# Patient Record
Sex: FEMALE | Race: WHITE
[De-identification: names, ages, dates, MRNs, and addresses within clinical notes are randomized per-mention and may not be internally consistent; named-entity substitution may affect disease eponyms.]

---

## 2017-09-17 ENCOUNTER — HOSPITAL ENCOUNTER (EMERGENCY)
Dept: HOSPITAL 26 - MED | Age: 77
Discharge: HOME | End: 2017-09-17
Payer: MEDICAID

## 2017-09-17 VITALS — DIASTOLIC BLOOD PRESSURE: 75 MMHG | SYSTOLIC BLOOD PRESSURE: 146 MMHG

## 2017-09-17 VITALS — DIASTOLIC BLOOD PRESSURE: 69 MMHG | SYSTOLIC BLOOD PRESSURE: 147 MMHG

## 2017-09-17 VITALS — BODY MASS INDEX: 27.08 KG/M2 | HEIGHT: 55 IN | WEIGHT: 117 LBS

## 2017-09-17 DIAGNOSIS — J45.909: ICD-10-CM

## 2017-09-17 DIAGNOSIS — Z79.899: ICD-10-CM

## 2017-09-17 DIAGNOSIS — E11.9: ICD-10-CM

## 2017-09-17 DIAGNOSIS — Z88.0: ICD-10-CM

## 2017-09-17 DIAGNOSIS — M19.90: ICD-10-CM

## 2017-09-17 DIAGNOSIS — I10: Primary | ICD-10-CM

## 2018-03-16 ENCOUNTER — HOSPITAL ENCOUNTER (EMERGENCY)
Dept: HOSPITAL 26 - MED | Age: 78
Discharge: HOME | End: 2018-03-16
Payer: MEDICAID

## 2018-03-16 VITALS — DIASTOLIC BLOOD PRESSURE: 86 MMHG | SYSTOLIC BLOOD PRESSURE: 123 MMHG

## 2018-03-16 VITALS — HEIGHT: 55 IN | WEIGHT: 119 LBS | BODY MASS INDEX: 27.54 KG/M2

## 2018-03-16 VITALS — DIASTOLIC BLOOD PRESSURE: 78 MMHG | SYSTOLIC BLOOD PRESSURE: 128 MMHG

## 2018-03-16 DIAGNOSIS — J20.9: Primary | ICD-10-CM

## 2018-03-16 DIAGNOSIS — J32.9: ICD-10-CM

## 2018-03-16 PROCEDURE — 87804 INFLUENZA ASSAY W/OPTIC: CPT

## 2018-03-16 PROCEDURE — 71045 X-RAY EXAM CHEST 1 VIEW: CPT

## 2018-03-16 PROCEDURE — 36415 COLL VENOUS BLD VENIPUNCTURE: CPT

## 2018-03-16 PROCEDURE — 99285 EMERGENCY DEPT VISIT HI MDM: CPT

## 2018-03-16 NOTE — NUR
Patient discharged with v/s stable. Written and verbal after care instructions 
given and explained. 

Patient alert, oriented and verbalized understanding of instructions. 
Ambulatory with steady gait. All questions addressed prior to discharge. ID 
band removed. Patient advised to follow up with PMD. Rx of 
ROBITUSSIN/AUGMENTIN/SUDAFED given. Patient educated on indication of 
medication including possible reaction and side effects. Opportunity to ask 
questions provided and answered.

## 2018-03-16 NOTE — NUR
PATIENT PRESENTS TO ED WITH COUGH PRODUCTIVE BODYACHES . PT STATES  . DENIES 
N/V/D; SKIN IS PINK/WARM/DRY; AAOX4 WITH EVEN AND STEADY GAIT; LUNGS RHONCHI 
UPPER AIRWAY ; HR EVEN AND REGULAR;  PATIENT STATES PAIN OF 5/10 AT THIS TIME; 
VSS; PATIENT POSITIONED FOR COMFORT; HOB ELEVATED; BEDRAILS UP X2; BED DOWN. ER 
MD MADE AWARE OF PT STATUS.

## 2018-10-20 ENCOUNTER — HOSPITAL ENCOUNTER (EMERGENCY)
Dept: HOSPITAL 26 - MED | Age: 78
Discharge: HOME | End: 2018-10-20
Payer: MEDICAID

## 2018-10-20 VITALS — DIASTOLIC BLOOD PRESSURE: 74 MMHG | SYSTOLIC BLOOD PRESSURE: 146 MMHG

## 2018-10-20 VITALS — BODY MASS INDEX: 37.76 KG/M2 | HEIGHT: 57 IN | WEIGHT: 175 LBS

## 2018-10-20 VITALS — SYSTOLIC BLOOD PRESSURE: 155 MMHG | DIASTOLIC BLOOD PRESSURE: 71 MMHG

## 2018-10-20 DIAGNOSIS — I10: ICD-10-CM
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
## 2018-10-20 DIAGNOSIS — J45.909: ICD-10-CM

## 2018-10-20 DIAGNOSIS — S09.90XA: ICD-10-CM
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
## 2018-10-20 DIAGNOSIS — Z79.84: ICD-10-CM

## 2018-10-20 DIAGNOSIS — E11.9: ICD-10-CM

## 2018-10-20 DIAGNOSIS — S16.1XXA: Primary | ICD-10-CM
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
## 2018-10-20 DIAGNOSIS — Z79.899: ICD-10-CM

## 2018-10-20 DIAGNOSIS — Z88.0: ICD-10-CM
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
## 2018-10-20 DIAGNOSIS — Z79.1: ICD-10-CM

## 2018-10-20 PROCEDURE — 96372 THER/PROPH/DIAG INJ SC/IM: CPT

## 2018-10-20 PROCEDURE — 72125 CT NECK SPINE W/O DYE: CPT

## 2018-10-20 PROCEDURE — 99284 EMERGENCY DEPT VISIT MOD MDM: CPT

## 2018-10-20 PROCEDURE — 70450 CT HEAD/BRAIN W/O DYE: CPT

## 2018-10-20 NOTE — NUR
Patient discharged with v/s stable. Written and verbal after care instructions 
given and explained. 

Patient alert, oriented and verbalized understanding of instructions. 
Ambulatory with steady gait. All questions addressed prior to discharge. ID 
band removed. Patient advised to follow up with PMD. Rx of MOTRIN TRAMADOL 
given. Patient educated on indication of medication including possible reaction 
and side effects. Opportunity to ask questions provided and answered.

## 2018-10-20 NOTE — NUR
78 yo f bib dtr w/ c/o head pain s/p hitting head on a door corner 2 days ago. 
pt denies n/v. no laceration. pain 9/10 burning pain radiates from forehead 
down the left side of body. pt reports dizziness, PERRLA, NO SLURRED SPEECH, 
AAOX4, ER MD MADE AWARE, WILL CONTINUE TO MONITOR. 



hx arthritis

## 2020-11-30 ENCOUNTER — HOSPITAL ENCOUNTER (EMERGENCY)
Dept: HOSPITAL 26 - MED | Age: 80
Discharge: HOME | End: 2020-11-30
Payer: MEDICAID

## 2020-11-30 VITALS — DIASTOLIC BLOOD PRESSURE: 71 MMHG | SYSTOLIC BLOOD PRESSURE: 166 MMHG

## 2020-11-30 VITALS — BODY MASS INDEX: 26.61 KG/M2 | WEIGHT: 115 LBS | HEIGHT: 55 IN

## 2020-11-30 DIAGNOSIS — Z79.82: ICD-10-CM
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
## 2020-11-30 DIAGNOSIS — S09.90XA: ICD-10-CM

## 2020-11-30 DIAGNOSIS — Z88.0: ICD-10-CM

## 2020-11-30 DIAGNOSIS — I10: ICD-10-CM

## 2020-11-30 DIAGNOSIS — S16.1XXA: Primary | ICD-10-CM
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
## 2020-11-30 DIAGNOSIS — Z79.899: ICD-10-CM

## 2020-11-30 DIAGNOSIS — J45.909: ICD-10-CM
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
## 2020-11-30 DIAGNOSIS — E11.9: ICD-10-CM

## 2020-11-30 RX ADMIN — ACETAMINOPHEN ONE MG: 325 TABLET ORAL at 19:58

## 2020-11-30 RX ADMIN — ACETAMINOPHEN ONE MG: 325 TABLET ORAL at 19:05

## 2020-11-30 NOTE — NUR
Patient discharged with v/s stable. Written and verbal after care instructions 
given and explained. 

Patient alert, oriented and verbalized understanding of instructions. 
Ambulatory with steady gait. All questions addressed prior to discharge. ID 
band removed. Patient advised to follow up with PMD. Rx of IBUPROFEN

 given. Patient educated on indication of medication including possible 
reaction and side effects. Opportunity to ask questions provided and answered.

## 2020-11-30 NOTE — NUR
79 YEAR OLD FEMALE COMPLAINS OF FALL X 3DAYS AGO. PT STATES SHE WAS DIZZY PRIOR 
TO FALLING, WITH LOC. PT DENIES N/V/D, STATES VISION IS FINE WITHOUT CHANGE. PT 
COMPLAINS OF HEADACHE THAT FEELS LIKE A "RAY" OR LINE THAT GOES FROM FRONT OF 
HEAD TO BACK. PT AOX4, BREATHING EVEN AND UNLABORED, SKIN WARM AND DRY. BED IN 
LOWEST POSITION, LOCKED, BED RAIL UPX1.



PMH - DM2, HTN, OA

ALLERGIES - PCN

## 2021-07-02 ENCOUNTER — HOSPITAL ENCOUNTER (EMERGENCY)
Dept: HOSPITAL 26 - MED | Age: 81
Discharge: HOME | End: 2021-07-02
Payer: MEDICAID

## 2021-07-02 VITALS — BODY MASS INDEX: 29.39 KG/M2 | WEIGHT: 140 LBS | HEIGHT: 58 IN

## 2021-07-02 VITALS — SYSTOLIC BLOOD PRESSURE: 152 MMHG | DIASTOLIC BLOOD PRESSURE: 77 MMHG

## 2021-07-02 VITALS — SYSTOLIC BLOOD PRESSURE: 110 MMHG | DIASTOLIC BLOOD PRESSURE: 53 MMHG
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
## 2021-07-02 DIAGNOSIS — I10: ICD-10-CM
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
## 2021-07-02 DIAGNOSIS — E78.5: ICD-10-CM
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
## 2021-07-02 DIAGNOSIS — Z88.6: ICD-10-CM

## 2021-07-02 DIAGNOSIS — R51.9: Primary | ICD-10-CM

## 2021-07-02 DIAGNOSIS — Z88.0: ICD-10-CM

## 2021-07-02 DIAGNOSIS — Z79.899: ICD-10-CM

## 2021-07-02 DIAGNOSIS — Z79.82: ICD-10-CM

## 2021-07-02 DIAGNOSIS — E11.9: ICD-10-CM

## 2021-07-02 LAB
ANION GAP SERPL CALCULATED.3IONS-SCNC: 13.4 MMOL/L (ref 8–16)
APPEARANCE UR: CLEAR
BASOPHILS # BLD AUTO: 0.1 K/UL (ref 0–0.22)
BASOPHILS NFR BLD AUTO: 1.1 % (ref 0–2)
BILIRUB UR QL STRIP: NEGATIVE
BUN SERPL-MCNC: 20 MG/DL (ref 7–18)
CHLORIDE SERPL-SCNC: 99 MMOL/L (ref 98–107)
CO2 SERPL-SCNC: 25.6 MMOL/L (ref 21–32)
COLOR UR: YELLOW
CREAT SERPL-MCNC: 0.6 MG/DL (ref 0.6–1.3)
EOSINOPHIL # BLD AUTO: 0.2 K/UL (ref 0–0.4)
EOSINOPHIL NFR BLD AUTO: 2.7 % (ref 0–4)
ERYTHROCYTE [DISTWIDTH] IN BLOOD BY AUTOMATED COUNT: 12.5 % (ref 11.6–13.7)
GFR SERPL CREATININE-BSD FRML MDRD: (no result) ML/MIN (ref 90–?)
GLUCOSE SERPL-MCNC: 90 MG/DL (ref 74–106)
GLUCOSE UR STRIP-MCNC: NEGATIVE MG/DL
HCT VFR BLD AUTO: 35.3 % (ref 36–48)
HGB BLD-MCNC: 11.7 G/DL (ref 12–16)
HGB UR QL STRIP: (no result)
LEUKOCYTE ESTERASE UR QL STRIP: NEGATIVE
LYMPHOCYTES # BLD AUTO: 1.7 K/UL (ref 2.5–16.5)
LYMPHOCYTES NFR BLD AUTO: 30.6 % (ref 20.5–51.1)
MCH RBC QN AUTO: 28 PG (ref 27–31)
MCHC RBC AUTO-ENTMCNC: 33 G/DL (ref 33–37)
MCV RBC AUTO: 83.4 FL (ref 80–94)
MONOCYTES # BLD AUTO: 0.6 K/UL (ref 0.8–1)
MONOCYTES NFR BLD AUTO: 10.9 % (ref 1.7–9.3)
NEUTROPHILS # BLD AUTO: 3.1 K/UL (ref 1.8–7.7)
NEUTROPHILS NFR BLD AUTO: 54.7 % (ref 42.2–75.2)
NITRITE UR QL STRIP: NEGATIVE
PH UR STRIP: 6 [PH] (ref 5–9)
PLATELET # BLD AUTO: 242 K/UL (ref 140–450)
POTASSIUM SERPL-SCNC: 4 MMOL/L (ref 3.5–5.1)
RBC # BLD AUTO: 4.23 MIL/UL (ref 4.2–5.4)
RBC #/AREA URNS HPF: (no result) /HPF (ref 0–5)
SODIUM SERPL-SCNC: 134 MMOL/L (ref 136–145)
WBC # BLD AUTO: 5.7 K/UL (ref 4.8–10.8)
WBC,URINE: (no result) /HPF (ref 0–5)

## 2021-10-27 ENCOUNTER — HOSPITAL ENCOUNTER (EMERGENCY)
Dept: HOSPITAL 26 - MED | Age: 81
Discharge: HOME | End: 2021-10-27
Payer: MEDICAID

## 2021-10-27 VITALS — DIASTOLIC BLOOD PRESSURE: 54 MMHG | SYSTOLIC BLOOD PRESSURE: 127 MMHG

## 2021-10-27 VITALS — BODY MASS INDEX: 26.15 KG/M2 | HEIGHT: 55 IN | WEIGHT: 113 LBS

## 2021-10-27 VITALS — DIASTOLIC BLOOD PRESSURE: 81 MMHG | SYSTOLIC BLOOD PRESSURE: 155 MMHG

## 2021-10-27 DIAGNOSIS — Z79.899: ICD-10-CM

## 2021-10-27 DIAGNOSIS — R20.0: ICD-10-CM

## 2021-10-27 DIAGNOSIS — K21.9: ICD-10-CM

## 2021-10-27 DIAGNOSIS — Z88.6: ICD-10-CM

## 2021-10-27 DIAGNOSIS — J45.909: ICD-10-CM

## 2021-10-27 DIAGNOSIS — E78.5: ICD-10-CM

## 2021-10-27 DIAGNOSIS — M79.10: Primary | ICD-10-CM

## 2021-10-27 DIAGNOSIS — R42: ICD-10-CM

## 2021-10-27 DIAGNOSIS — Z79.82: ICD-10-CM

## 2021-10-27 DIAGNOSIS — I10: ICD-10-CM

## 2021-10-27 DIAGNOSIS — Z88.0: ICD-10-CM

## 2021-10-27 DIAGNOSIS — M54.2: ICD-10-CM

## 2021-10-27 DIAGNOSIS — R51.9: ICD-10-CM

## 2021-10-27 LAB
ALBUMIN FLD-MCNC: 3.4 G/DL (ref 3.4–5)
ANION GAP SERPL CALCULATED.3IONS-SCNC: 9.3 MMOL/L (ref 8–16)
APPEARANCE UR: CLEAR
AST SERPL-CCNC: 28 U/L (ref 15–37)
BASOPHILS # BLD AUTO: 0.1 K/UL (ref 0–0.22)
BASOPHILS NFR BLD AUTO: 1.4 % (ref 0–2)
BILIRUB SERPL-MCNC: 0.4 MG/DL (ref 0–1)
BILIRUB UR QL STRIP: NEGATIVE
BUN SERPL-MCNC: 14 MG/DL (ref 7–18)
CHLORIDE SERPL-SCNC: 103 MMOL/L (ref 98–107)
CO2 SERPL-SCNC: 28.4 MMOL/L (ref 21–32)
COLOR UR: YELLOW
CREAT SERPL-MCNC: 0.5 MG/DL (ref 0.6–1.3)
EOSINOPHIL # BLD AUTO: 0.1 K/UL (ref 0–0.4)
EOSINOPHIL NFR BLD AUTO: 2.8 % (ref 0–4)
ERYTHROCYTE [DISTWIDTH] IN BLOOD BY AUTOMATED COUNT: 12.3 % (ref 11.6–13.7)
GFR SERPL CREATININE-BSD FRML MDRD: (no result) ML/MIN (ref 90–?)
GLUCOSE SERPL-MCNC: 78 MG/DL (ref 74–106)
GLUCOSE UR STRIP-MCNC: NEGATIVE MG/DL
HCT VFR BLD AUTO: 32.6 % (ref 36–48)
HGB BLD-MCNC: 10.8 G/DL (ref 12–16)
HGB UR QL STRIP: NEGATIVE
LEUKOCYTE ESTERASE UR QL STRIP: NEGATIVE
LYMPHOCYTES # BLD AUTO: 1.6 K/UL (ref 2.5–16.5)
LYMPHOCYTES NFR BLD AUTO: 32.7 % (ref 20.5–51.1)
MCH RBC QN AUTO: 28 PG (ref 27–31)
MCHC RBC AUTO-ENTMCNC: 33 G/DL (ref 33–37)
MCV RBC AUTO: 83.7 FL (ref 80–94)
MONOCYTES # BLD AUTO: 0.5 K/UL (ref 0.8–1)
MONOCYTES NFR BLD AUTO: 10 % (ref 1.7–9.3)
NEUTROPHILS # BLD AUTO: 2.5 K/UL (ref 1.8–7.7)
NEUTROPHILS NFR BLD AUTO: 53.1 % (ref 42.2–75.2)
NITRITE UR QL STRIP: NEGATIVE
PH UR STRIP: 6.5 [PH] (ref 5–9)
PLATELET # BLD AUTO: 251 K/UL (ref 140–450)
POTASSIUM SERPL-SCNC: 3.7 MMOL/L (ref 3.5–5.1)
RBC # BLD AUTO: 3.9 MIL/UL (ref 4.2–5.4)
SODIUM SERPL-SCNC: 137 MMOL/L (ref 136–145)
WBC # BLD AUTO: 4.8 K/UL (ref 4.8–10.8)

## 2021-10-27 PROCEDURE — 85025 COMPLETE CBC W/AUTO DIFF WBC: CPT

## 2021-10-27 PROCEDURE — 84443 ASSAY THYROID STIM HORMONE: CPT

## 2021-10-27 PROCEDURE — 81003 URINALYSIS AUTO W/O SCOPE: CPT

## 2021-10-27 PROCEDURE — 93005 ELECTROCARDIOGRAM TRACING: CPT

## 2021-10-27 PROCEDURE — 84484 ASSAY OF TROPONIN QUANT: CPT

## 2021-10-27 PROCEDURE — 36415 COLL VENOUS BLD VENIPUNCTURE: CPT

## 2021-10-27 PROCEDURE — 96374 THER/PROPH/DIAG INJ IV PUSH: CPT

## 2021-10-27 PROCEDURE — 99285 EMERGENCY DEPT VISIT HI MDM: CPT

## 2021-10-27 PROCEDURE — 80053 COMPREHEN METABOLIC PANEL: CPT

## 2021-10-27 PROCEDURE — 83880 ASSAY OF NATRIURETIC PEPTIDE: CPT

## 2021-10-27 PROCEDURE — 71045 X-RAY EXAM CHEST 1 VIEW: CPT

## 2021-10-27 PROCEDURE — 96375 TX/PRO/DX INJ NEW DRUG ADDON: CPT

## 2021-10-27 RX ADMIN — SODIUM CHLORIDE ONE MG: 9 INJECTION, SOLUTION INTRAVENOUS at 12:06

## 2021-10-27 RX ADMIN — SODIUM CHLORIDE ONE MG: 9 INJECTION, SOLUTION INTRAVENOUS at 12:05

## 2021-10-27 NOTE — NUR
Patient discharged with v/s stable. Written and verbal after care instructions 
given and explained. 

Patient alert, oriented and verbalized understanding of instructions. Wheel 
Chair Assisted with by parent. All questions addressed prior to discharge. ID 
band removed. Patient advised to follow up with PMD. Rx of VALIUM given. 
Patient educated on indication of medication including possible reaction and 
side effects. Opportunity to ask questions provided and answered.

## 2021-10-27 NOTE — NUR
Patient appears to be resting comfortably in bed WITH EYES CLOSED. Vital Signs 
within normal limits AND CHARTED. Respirations even and unlabored. DAUGHTER 
BEDSIDE WITH PATIENT

## 2021-10-27 NOTE — NUR
80/F BIB DAUGHTER WITH C/O BILATERAL LEG SWELLING SINCE LAST NIGHT. STATES HER 
BLOOD PRESSURE HAS BEEN ELEVATED AND SHE HAS BEEN EXPERIENCING INTERMITTENT 
EPISODES OF DIZZINESS. STATES HER PCP REFERRED TO ED FOR AN EKG. PT ALSO STATED 
SHE HAS HAD SOME PAIN RADIATING FROM HER HEAD DOWN HER NECK AND L ARM. PAIN IS 
CURRENTLY 10/10 AND STABBING LIKE PAIN. BREATH SOUNDS CLEAR BILATERALLY. MINOR 
PITTING EDEMA NOTED ON HER L ANKLE. S1/S2 HEAD AND CAP REFILL <2 SECONDS. SKIN 
DRY AND INTACT. 



MEDHX: HTN, ARTHRITIS

ALLERGIES: PENICILLINS, ACETAMINOPHEN

## 2022-11-27 ENCOUNTER — HOSPITAL ENCOUNTER (EMERGENCY)
Dept: HOSPITAL 26 - MED | Age: 82
Discharge: HOME | End: 2022-11-27
Payer: COMMERCIAL

## 2022-11-27 VITALS — SYSTOLIC BLOOD PRESSURE: 185 MMHG | DIASTOLIC BLOOD PRESSURE: 81 MMHG

## 2022-11-27 VITALS — HEIGHT: 55 IN | WEIGHT: 115 LBS | BODY MASS INDEX: 26.61 KG/M2

## 2022-11-27 VITALS — DIASTOLIC BLOOD PRESSURE: 78 MMHG | SYSTOLIC BLOOD PRESSURE: 148 MMHG

## 2022-11-27 DIAGNOSIS — Z20.822: ICD-10-CM

## 2022-11-27 DIAGNOSIS — Z88.0: ICD-10-CM

## 2022-11-27 DIAGNOSIS — J45.909: ICD-10-CM

## 2022-11-27 DIAGNOSIS — K04.7: Primary | ICD-10-CM

## 2022-11-27 DIAGNOSIS — D64.9: ICD-10-CM

## 2022-11-27 DIAGNOSIS — K21.9: ICD-10-CM

## 2022-11-27 DIAGNOSIS — R11.10: ICD-10-CM

## 2022-11-27 DIAGNOSIS — Z79.82: ICD-10-CM

## 2022-11-27 DIAGNOSIS — R05.9: ICD-10-CM

## 2022-11-27 DIAGNOSIS — Z88.6: ICD-10-CM

## 2022-11-27 DIAGNOSIS — E87.1: ICD-10-CM

## 2022-11-27 DIAGNOSIS — I10: ICD-10-CM

## 2022-11-27 DIAGNOSIS — K08.89: ICD-10-CM

## 2022-11-27 DIAGNOSIS — Z79.899: ICD-10-CM

## 2022-11-27 LAB
ALBUMIN FLD-MCNC: 3.1 G/DL (ref 3.4–5)
ANION GAP SERPL CALCULATED.3IONS-SCNC: 11.8 MMOL/L (ref 8–16)
APAP SERPL-MCNC: < 0.5 UG/ML (ref 10–30)
AST SERPL-CCNC: 37 U/L (ref 15–37)
BASOPHILS # BLD AUTO: 0 K/UL (ref 0–0.22)
BASOPHILS NFR BLD AUTO: 0.5 % (ref 0–2)
BILIRUB SERPL-MCNC: 0.3 MG/DL (ref 0–1)
BUN SERPL-MCNC: 10 MG/DL (ref 7–18)
CHLORIDE SERPL-SCNC: 87 MMOL/L (ref 98–107)
CO2 SERPL-SCNC: 27.2 MMOL/L (ref 21–32)
CREAT SERPL-MCNC: 0.5 MG/DL (ref 0.6–1.3)
EOSINOPHIL # BLD AUTO: 0 K/UL (ref 0–0.4)
EOSINOPHIL NFR BLD AUTO: 0.1 % (ref 0–4)
ERYTHROCYTE [DISTWIDTH] IN BLOOD BY AUTOMATED COUNT: 11.9 % (ref 11.6–13.7)
GFR SERPL CREATININE-BSD FRML MDRD: (no result) ML/MIN (ref 90–?)
GLUCOSE SERPL-MCNC: 125 MG/DL (ref 74–106)
HCT VFR BLD AUTO: 29.7 % (ref 36–48)
HGB BLD-MCNC: 9.8 G/DL (ref 12–16)
LYMPHOCYTES # BLD AUTO: 0.7 K/UL (ref 2.5–16.5)
LYMPHOCYTES NFR BLD AUTO: 11.8 % (ref 20.5–51.1)
MCH RBC QN AUTO: 27 PG (ref 27–31)
MCHC RBC AUTO-ENTMCNC: 33 G/DL (ref 33–37)
MCV RBC AUTO: 82.3 FL (ref 80–94)
MONOCYTES # BLD AUTO: 0.4 K/UL (ref 0.8–1)
MONOCYTES NFR BLD AUTO: 6 % (ref 1.7–9.3)
NEUTROPHILS # BLD AUTO: 4.9 K/UL (ref 1.8–7.7)
NEUTROPHILS NFR BLD AUTO: 81.6 % (ref 42.2–75.2)
PLATELET # BLD AUTO: 215 K/UL (ref 140–450)
POTASSIUM SERPL-SCNC: 4 MMOL/L (ref 3.5–5.1)
RBC # BLD AUTO: 3.61 MIL/UL (ref 4.2–5.4)
SODIUM SERPL-SCNC: 122 MMOL/L (ref 136–145)
WBC # BLD AUTO: 6 K/UL (ref 4.8–10.8)

## 2022-11-27 PROCEDURE — 36415 COLL VENOUS BLD VENIPUNCTURE: CPT

## 2022-11-27 PROCEDURE — 84484 ASSAY OF TROPONIN QUANT: CPT

## 2022-11-27 PROCEDURE — 87804 INFLUENZA ASSAY W/OPTIC: CPT

## 2022-11-27 PROCEDURE — G0480 DRUG TEST DEF 1-7 CLASSES: HCPCS

## 2022-11-27 PROCEDURE — 99285 EMERGENCY DEPT VISIT HI MDM: CPT

## 2022-11-27 PROCEDURE — 83880 ASSAY OF NATRIURETIC PEPTIDE: CPT

## 2022-11-27 PROCEDURE — 85025 COMPLETE CBC W/AUTO DIFF WBC: CPT

## 2022-11-27 PROCEDURE — 87426 SARSCOV CORONAVIRUS AG IA: CPT

## 2022-11-27 PROCEDURE — 71045 X-RAY EXAM CHEST 1 VIEW: CPT

## 2022-11-27 PROCEDURE — 80053 COMPREHEN METABOLIC PANEL: CPT

## 2022-11-27 PROCEDURE — 93005 ELECTROCARDIOGRAM TRACING: CPT

## 2022-11-27 NOTE — NUR
Patient discharged with v/s stable. Written and verbal after care instructions 
given and explained. 

Patient alert, oriented and verbalized understanding of instructions. 
Ambulatory with steady gait. All questions addressed prior to discharge. ID 
band removed. Patient advised to follow up with PMD. Rx of CLINDAMYCIN AND 
ZOFRAN given. Patient educated on indication of medication including possible 
reaction and side effects. Opportunity to ask questions provided and answered.

## 2022-11-27 NOTE — NUR
81F presents to ED with c/o cough and congestion x4days, and dizziness, nausea 
and headache since this morning. Pt reports a worsening productive cough, 
coughing up phlegm. Pt reports intermittent aching like, 8/10 headache. Pt 
states she took DayQuil yesterday and NyQuil this morning with no relief. Pt 
reports feeling dizzy shortly after takin the Nyquil. Pt on bedside monitor.

## 2023-04-01 ENCOUNTER — HOSPITAL ENCOUNTER (INPATIENT)
Dept: HOSPITAL 26 - MED | Age: 83
LOS: 4 days | Discharge: HOME | DRG: 720 | End: 2023-04-05
Attending: INTERNAL MEDICINE | Admitting: INTERNAL MEDICINE
Payer: COMMERCIAL

## 2023-04-01 VITALS — WEIGHT: 107 LBS | HEIGHT: 56 IN | BODY MASS INDEX: 24.07 KG/M2

## 2023-04-01 VITALS — DIASTOLIC BLOOD PRESSURE: 64 MMHG | SYSTOLIC BLOOD PRESSURE: 146 MMHG

## 2023-04-01 DIAGNOSIS — J45.909: ICD-10-CM

## 2023-04-01 DIAGNOSIS — Z88.8: ICD-10-CM

## 2023-04-01 DIAGNOSIS — Z88.0: ICD-10-CM

## 2023-04-01 DIAGNOSIS — I11.0: ICD-10-CM

## 2023-04-01 DIAGNOSIS — K21.9: ICD-10-CM

## 2023-04-01 DIAGNOSIS — Z79.899: ICD-10-CM

## 2023-04-01 DIAGNOSIS — Z86.16: ICD-10-CM

## 2023-04-01 DIAGNOSIS — U07.1: ICD-10-CM

## 2023-04-01 DIAGNOSIS — Z88.1: ICD-10-CM

## 2023-04-01 DIAGNOSIS — I50.21: ICD-10-CM

## 2023-04-01 DIAGNOSIS — E87.1: ICD-10-CM

## 2023-04-01 DIAGNOSIS — Z86.73: ICD-10-CM

## 2023-04-01 DIAGNOSIS — A41.89: Primary | ICD-10-CM

## 2023-04-01 DIAGNOSIS — J96.01: ICD-10-CM

## 2023-04-01 LAB
ALBUMIN FLD-MCNC: 3.2 G/DL (ref 3.4–5)
ALBUMIN FLD-MCNC: 3.3 G/DL (ref 3.4–5)
ANION GAP SERPL CALCULATED.3IONS-SCNC: 12.3 MMOL/L (ref 8–16)
AST SERPL-CCNC: 33 U/L (ref 15–37)
AST SERPL-CCNC: 41 U/L (ref 15–37)
BASOPHILS # BLD AUTO: 0 K/UL (ref 0–0.22)
BASOPHILS NFR BLD AUTO: 0.7 % (ref 0–2)
BILIRUB DIRECT SERPL-MCNC: 0.1 MG/DL (ref 0–0.3)
BILIRUB SERPL-MCNC: 0.3 MG/DL (ref 0–1)
BILIRUB SERPL-MCNC: 0.3 MG/DL (ref 0–1)
BUN SERPL-MCNC: 8 MG/DL (ref 7–18)
CHLORIDE SERPL-SCNC: 81 MMOL/L (ref 98–107)
CO2 SERPL-SCNC: 26 MMOL/L (ref 21–32)
CREAT SERPL-MCNC: 0.6 MG/DL (ref 0.6–1.3)
EOSINOPHIL # BLD AUTO: 0 K/UL (ref 0–0.4)
EOSINOPHIL NFR BLD AUTO: 0.1 % (ref 0–4)
ERYTHROCYTE [DISTWIDTH] IN BLOOD BY AUTOMATED COUNT: 12.9 % (ref 11.6–13.7)
GFR SERPL CREATININE-BSD FRML MDRD: (no result) ML/MIN (ref 90–?)
GLUCOSE SERPL-MCNC: 107 MG/DL (ref 74–106)
HCT VFR BLD AUTO: 28.4 % (ref 36–48)
HGB BLD-MCNC: 9.5 G/DL (ref 12–16)
LYMPHOCYTES # BLD AUTO: 0.6 K/UL (ref 2.5–16.5)
LYMPHOCYTES NFR BLD AUTO: 14.1 % (ref 20.5–51.1)
MCH RBC QN AUTO: 26 PG (ref 27–31)
MCHC RBC AUTO-ENTMCNC: 33 G/DL (ref 33–37)
MCV RBC AUTO: 78.1 FL (ref 80–94)
MONOCYTES # BLD AUTO: 0.5 K/UL (ref 0.8–1)
MONOCYTES NFR BLD AUTO: 12.4 % (ref 1.7–9.3)
NEUTROPHILS # BLD AUTO: 2.9 K/UL (ref 1.8–7.7)
NEUTROPHILS NFR BLD AUTO: 72.7 % (ref 42.2–75.2)
PLATELET # BLD AUTO: 225 K/UL (ref 140–450)
POTASSIUM SERPL-SCNC: 4.3 MMOL/L (ref 3.5–5.1)
PROTHROMBIN TIME: 11.3 SECS (ref 10.8–13.4)
RBC # BLD AUTO: 3.64 MIL/UL (ref 4.2–5.4)
SODIUM SERPL-SCNC: 115 MMOL/L (ref 136–145)
WBC # BLD AUTO: 4 K/UL (ref 4.8–10.8)

## 2023-04-01 RX ADMIN — Medication SCH MG: at 21:37

## 2023-04-01 NOTE — NUR
PT WAS ADMITTED TO [facility] DEPARTMENT FROM ER WITH DIAGNOSIS OF COVID PNA AND HYPONATREMIA. PT 
IS AOX4, Thai SPEAKING, ABLE TO VERBALIZE NEEDS AND ABLE TO FOLLOW COMMANDS. PT IS ON BED 
REST, ON 3L NC AND ON REGULAR DIET. PT HAS IV ON RIGHT HAND GAUGE 22 SALINE LOCK. PT SKIN IS 
INTACT. NO S/S OF RESPIRATORY DISTRESS NOTED AND NO COMPLAIN OF PAIN. PT WAS ORIENTED TO 
HOSPITAL/ROOM, BED BUTTON AND CALL LIGHT. ALL SAFETY MEASURES IMPLEMENTED. BED IN LOW 
POSITION, BED WHEELS ON LOCK AND CALL LIGHT WITHIN REACH

## 2023-04-01 NOTE — NUR
HISTORY AND PHYSICAL  ? ? Patient Name: Jo-Ann Reid  Patient MRN: [id number]  Attending Provider: Carli Galan MD  Service: Urology  Chief Complaint    Gross hematuria, possible bladder lesion, bilateral staghorn renal calculi    HPI   Jo-Ann Reid is a 79 y o  female with intermittent gross hematuria for several months  Recent kidney imaging shows either clot or neoplasm in bladder  Also bilateral stones:  Huge staghorn in left kidney, and several stones 1-2 cm in right kidney  No hydronephrosis, decent kidney function on blood test   Recent urine culture negative  I plan cysto, clot evacuation, cautery bleeding, any other procedures as indicated at that time  Also bilateral retrograde pyelograms  We do not plan any stone treatment at this time  Potential risks and complications discussed, and informed consent was given by the patient  Medications  Meds/Allergies     No current facility-administered medications for this encounter  Cannot display prior to admission medications because the patient has not been admitted in this contact  No current facility-administered medications for this encounter       Current Outpatient Medications:     amLODIPine (NORVASC) 10 mg tablet, Take 1 tablet (10 mg total) by mouth daily, Disp: 90 tablet, Rfl: 1    atorvastatin (LIPITOR) 40 mg tablet, Take 1 tablet (40 mg total) by mouth daily, Disp: 90 tablet, Rfl: 0    ergocalciferol (VITAMIN D2) 50,000 units, TAKE 1 CAPSULE BY MOUTH ONCE A WEEK, Disp: 12 capsule, Rfl: 0    meclizine (ANTIVERT) 12 5 MG tablet, Take 1 tablet (12 5 mg total) by mouth 3 (three) times a day as needed for dizziness, Disp: 30 tablet, Rfl: 0    metFORMIN (GLUCOPHAGE) 500 mg tablet, Take 1 tablet (500 mg total) by mouth 2 (two) times a day with meals, Disp: 60 tablet, Rfl: 3  Review of Systems  10 point review of systems negative except as noted in HPI  Allergies  Allergies   Allergen Reactions    Lisinopril Angioedema     Other SCHEDULED AND PRESCRIBED MEDICATION WAS GIVEN TO PT AS PER MD ORDER. NO COMPLAIN OF PAIN AT 
THIS TIME. NO S/S OF RESPIRATORY DISTRESS NOTED. ALL SAFETY MEASURES IMPLEMENTED. BED IN LOW 
POSITION, BED WHEELS ON LOCK AND CALL LIGHT WITHIN REACH. reaction(s): Swelling of Lips/Face  Other reaction(s): Swelling of Lips/Face     PMH  Past Medical History:   Diagnosis Date    Arthritis     osteo arthritis    Blood in urine     Chicken pox     Cough     dtr" occas feels due to goiter"    Diabetes mellitus ([address] )     Disease of thyroid gland     large goiter    [place] (hard of hearing)     Hyperlipidemia     Hypertension     Kidney stones 2017    Kidney stones 2017    Osteopenia     Shortness of breath     sometimes, dtr feels related to goiter in neck    Vertigo     Wears glasses      Past surgical history  Past Surgical History:   Procedure Laterality Date    KIDNEY STONE SURGERY      KNEE ARTHROSCOPY Left     KNEE ARTHROSCOPY Right     SHOULDER ARTHROSCOPY       Social history  Social History     Tobacco Use    Smoking status: Never Smoker    Smokeless tobacco: Never Used   Substance Use Topics    Alcohol use: Not Currently     Frequency: Monthly or less     Drinks per session: 1 or 2    Drug use: Never     ? Physical Exam     vs  General appearance: alert and oriented, in no acute distress  Head: Normocephalic, without obvious abnormality, atraumatic  Eyes: conjunctivae/corneas clear  PERRL, EOM's intact  Fundi benign  Throat: lips, mucosa, and tongue normal; teeth and gums normal  Neck: no adenopathy, no carotid bruit, no JVD, supple, symmetrical, trachea midline and thyroid not enlarged, symmetric, no tenderness/mass/nodules  Back: symmetric, no curvature  ROM normal  No CVA tenderness    Lungs: clear to auscultation bilaterally  Heart: regular rate and rhythm, S1, S2 normal, no murmur, click, rub or gallop  Abdomen: soft, non-tender; bowel sounds normal; no masses,  no organomegaly  Extremities: extremities normal, warm and well-perfused; no cyanosis, clubbing, or edema  Lymph nodes: Cervical, supraclavicular, and axillary nodes normal   Neurologic: Grossly normal  Lizbeth Fernandes MD

## 2023-04-01 NOTE — NUR
here for weakness, right lower leg pain, throat dryness and bodyach

sr on cm, o2 sat 94% ra, sr up times 2.

## 2023-04-01 NOTE — NUR
had possible allergic reaction to rocephin, c/o wheezing, sob, low o2 sat on 
cm, 82at 4 l/min 

med stopped, Dr Lafleur notified, epi im given as ordered.    

felt better, o2 sat 98% 2 l/m via nc, lungs diffuse wheezing. 

no hypotension.

## 2023-04-01 NOTE — NUR
pt sleeping, no ac distres, received albuterol tx, sr on cm, o2 sat 98% 4 l/m 
via nc, sr up times 2

## 2023-04-01 NOTE — NUR
aPatient will be admitted to care of [facility]. Admited to tele.  Will go to 
room 117. Belongings list completed.  Report to cherish.

## 2023-04-01 NOTE — NUR
pt is resting. vital sign within nomal limits . pt is Citizen of [place] speaker. nasal 
canula 2L

## 2023-04-02 VITALS — SYSTOLIC BLOOD PRESSURE: 141 MMHG | DIASTOLIC BLOOD PRESSURE: 71 MMHG

## 2023-04-02 VITALS — SYSTOLIC BLOOD PRESSURE: 117 MMHG | DIASTOLIC BLOOD PRESSURE: 61 MMHG

## 2023-04-02 VITALS — SYSTOLIC BLOOD PRESSURE: 124 MMHG | DIASTOLIC BLOOD PRESSURE: 58 MMHG

## 2023-04-02 VITALS — SYSTOLIC BLOOD PRESSURE: 121 MMHG | DIASTOLIC BLOOD PRESSURE: 69 MMHG

## 2023-04-02 VITALS — SYSTOLIC BLOOD PRESSURE: 123 MMHG | DIASTOLIC BLOOD PRESSURE: 61 MMHG

## 2023-04-02 LAB
ALBUMIN FLD-MCNC: 3.1 G/DL (ref 3.4–5)
ALBUMIN FLD-MCNC: 3.1 G/DL (ref 3.4–5)
ANION GAP SERPL CALCULATED.3IONS-SCNC: 11.7 MMOL/L (ref 8–16)
ANION GAP SERPL CALCULATED.3IONS-SCNC: 11.9 MMOL/L (ref 8–16)
ANION GAP SERPL CALCULATED.3IONS-SCNC: 12.1 MMOL/L (ref 8–16)
AST SERPL-CCNC: 41 U/L (ref 15–37)
AST SERPL-CCNC: 42 U/L (ref 15–37)
BASOPHILS # BLD AUTO: 0 K/UL (ref 0–0.22)
BASOPHILS NFR BLD AUTO: 0.4 % (ref 0–2)
BILIRUB DIRECT SERPL-MCNC: 0.1 MG/DL (ref 0–0.3)
BILIRUB SERPL-MCNC: 0.2 MG/DL (ref 0–1)
BILIRUB SERPL-MCNC: 0.2 MG/DL (ref 0–1)
BUN SERPL-MCNC: 7 MG/DL (ref 7–18)
BUN SERPL-MCNC: 7 MG/DL (ref 7–18)
BUN SERPL-MCNC: 9 MG/DL (ref 7–18)
CHLORIDE SERPL-SCNC: 86 MMOL/L (ref 98–107)
CHLORIDE SERPL-SCNC: 89 MMOL/L (ref 98–107)
CHLORIDE SERPL-SCNC: 89 MMOL/L (ref 98–107)
CO2 SERPL-SCNC: 25.1 MMOL/L (ref 21–32)
CO2 SERPL-SCNC: 28.7 MMOL/L (ref 21–32)
CO2 SERPL-SCNC: 28.8 MMOL/L (ref 21–32)
CREAT SERPL-MCNC: 0.5 MG/DL (ref 0.6–1.3)
CREAT SERPL-MCNC: 0.6 MG/DL (ref 0.6–1.3)
CREAT SERPL-MCNC: 0.7 MG/DL (ref 0.6–1.3)
EOSINOPHIL # BLD AUTO: 0 K/UL (ref 0–0.4)
EOSINOPHIL NFR BLD AUTO: 0.1 % (ref 0–4)
ERYTHROCYTE [DISTWIDTH] IN BLOOD BY AUTOMATED COUNT: 13.4 % (ref 11.6–13.7)
GFR SERPL CREATININE-BSD FRML MDRD: (no result) ML/MIN (ref 90–?)
GLUCOSE SERPL-MCNC: 142 MG/DL (ref 74–106)
GLUCOSE SERPL-MCNC: 179 MG/DL (ref 74–106)
GLUCOSE SERPL-MCNC: 89 MG/DL (ref 74–106)
HCT VFR BLD AUTO: 29.9 % (ref 36–48)
HGB BLD-MCNC: 9.8 G/DL (ref 12–16)
LYMPHOCYTES # BLD AUTO: 0.8 K/UL (ref 2.5–16.5)
LYMPHOCYTES NFR BLD AUTO: 21.5 % (ref 20.5–51.1)
MAGNESIUM SERPL-MCNC: 1.5 MG/DL (ref 1.8–2.4)
MCH RBC QN AUTO: 26 PG (ref 27–31)
MCHC RBC AUTO-ENTMCNC: 33 G/DL (ref 33–37)
MCV RBC AUTO: 78.5 FL (ref 80–94)
MONOCYTES # BLD AUTO: 0.5 K/UL (ref 0.8–1)
MONOCYTES NFR BLD AUTO: 13.6 % (ref 1.7–9.3)
NEUTROPHILS # BLD AUTO: 2.4 K/UL (ref 1.8–7.7)
NEUTROPHILS NFR BLD AUTO: 64.4 % (ref 42.2–75.2)
PLATELET # BLD AUTO: 227 K/UL (ref 140–450)
POTASSIUM SERPL-SCNC: 2.9 MMOL/L (ref 3.5–5.1)
POTASSIUM SERPL-SCNC: 3.6 MMOL/L (ref 3.5–5.1)
POTASSIUM SERPL-SCNC: 3.8 MMOL/L (ref 3.5–5.1)
RBC # BLD AUTO: 3.81 MIL/UL (ref 4.2–5.4)
SODIUM SERPL-SCNC: 119 MMOL/L (ref 136–145)
SODIUM SERPL-SCNC: 126 MMOL/L (ref 136–145)
SODIUM SERPL-SCNC: 127 MMOL/L (ref 136–145)
WBC # BLD AUTO: 3.8 K/UL (ref 4.8–10.8)

## 2023-04-02 RX ADMIN — Medication SCH MG: at 21:05

## 2023-04-02 RX ADMIN — FUROSEMIDE SCH MG: 10 INJECTION, SOLUTION INTRAMUSCULAR; INTRAVENOUS at 14:02

## 2023-04-02 RX ADMIN — Medication SCH MG: at 09:11

## 2023-04-02 RX ADMIN — FOLIC ACID SCH MLS/HR: 5 INJECTION, SOLUTION INTRAMUSCULAR; INTRAVENOUS; SUBCUTANEOUS at 17:52

## 2023-04-02 RX ADMIN — ENOXAPARIN SODIUM SCH MG: 40 INJECTION SUBCUTANEOUS at 09:20

## 2023-04-02 RX ADMIN — DEXTROSE SCH MLS/HR: 50 INJECTION, SOLUTION INTRAVENOUS at 20:58

## 2023-04-02 RX ADMIN — PANTOPRAZOLE SODIUM SCH MG: 40 TABLET, DELAYED RELEASE ORAL at 09:11

## 2023-04-02 RX ADMIN — IBUPROFEN PRN MG: 200 TABLET, FILM COATED ORAL at 21:18

## 2023-04-02 RX ADMIN — Medication SCH MG: at 12:22

## 2023-04-02 RX ADMIN — IBUPROFEN PRN MG: 200 TABLET, FILM COATED ORAL at 12:10

## 2023-04-02 RX ADMIN — LOSARTAN POTASSIUM SCH MG: 50 TABLET, FILM COATED ORAL at 12:22

## 2023-04-02 RX ADMIN — FUROSEMIDE SCH MG: 10 INJECTION, SOLUTION INTRAMUSCULAR; INTRAVENOUS at 20:58

## 2023-04-02 RX ADMIN — IBUPROFEN PRN MG: 200 TABLET, FILM COATED ORAL at 00:56

## 2023-04-02 NOTE — NUR
MORNING CARE WAS DONE TO PT. CHANGED CHUCKS, LINENS AND GOWN. PUT A PUREWICK ALSO TO PT. ALL 
SAFETY MEASURES IMPLEMENTED. BED IN LOW POSITION, BED WHEELS ON LOCK AND CALL LIGHT WITHIN 
REACH.

## 2023-04-02 NOTE — NUR
PT WAS GIVEN IBUPROFEN 200MG DUE TO FEVER (100.4) NO S/S OF RESPIRATORY DISTRESS NOTED. ALL 
SAFETY MEASURES IMPLEMENTED. BED IN LOW POSITION, BED WHEELS ON LOCK AND CALL LIGHT WITHIN 
REACH.

## 2023-04-02 NOTE — NUR
PT IS ON SLEEP. CHEST RISE AND FALL SYMMETRICALLY NOTED. RESPIRATION IS EVEN AND UNLABORED. 
NO S/S OF RESPIRATORY DISTRESS NOTED. ALL SAFETY MEASURES IMPLEMENTED. BED IN LOW POSITION, 
BED WHEELS ON LOCK AND CALL LIGHT WITHIN REACH.

## 2023-04-02 NOTE — NUR
PT LATEST TEMP IS 98.9 PT DENIES PAIN. NO S/S OF RESPIRATORY DISTRESS NOTED.  ALL SAFETY 
MEASURES IMPLEMENTED. BED IN LOW POSITION, BED WHEELS ON LOCK AND CALL LIGHT WITHIN REACH.

## 2023-04-02 NOTE — NUR
PATIENT HAS BEEN SCREENED AND CATEGORIZED AS MODERATE NUTRITION RISK. PATIENT WILL BE SEEN 
WITHIN 3-5 DAYS OF ADMISSION.



4/1/23-4/6/23



CARMEN PH[address] RD

## 2023-04-02 NOTE — NUR
CHECKED THE PT STILL ON SLEEP. CHEST RISE AND FALL SYMMETRICALLY NOTED. RESPIRATION IS EVEN 
AND UNLABORED. NO S/S OF RESPIRATORY DISTRESS NOTED. ALL SAFETY MEASURES IMPLEMENTED. BED IN 
LOW POSITION, BED WHEELS ON LOCK AND CALL LIGHT WITHIN REACH.

## 2023-04-03 VITALS — SYSTOLIC BLOOD PRESSURE: 134 MMHG | DIASTOLIC BLOOD PRESSURE: 70 MMHG

## 2023-04-03 VITALS — DIASTOLIC BLOOD PRESSURE: 64 MMHG | SYSTOLIC BLOOD PRESSURE: 123 MMHG

## 2023-04-03 VITALS — DIASTOLIC BLOOD PRESSURE: 78 MMHG | SYSTOLIC BLOOD PRESSURE: 118 MMHG

## 2023-04-03 VITALS — SYSTOLIC BLOOD PRESSURE: 144 MMHG | DIASTOLIC BLOOD PRESSURE: 83 MMHG

## 2023-04-03 VITALS — DIASTOLIC BLOOD PRESSURE: 75 MMHG | SYSTOLIC BLOOD PRESSURE: 119 MMHG

## 2023-04-03 VITALS — SYSTOLIC BLOOD PRESSURE: 138 MMHG | DIASTOLIC BLOOD PRESSURE: 75 MMHG

## 2023-04-03 LAB
ALBUMIN FLD-MCNC: 2.9 G/DL (ref 3.4–5)
ANION GAP SERPL CALCULATED.3IONS-SCNC: 12.6 MMOL/L (ref 8–16)
ANION GAP SERPL CALCULATED.3IONS-SCNC: 12.9 MMOL/L (ref 8–16)
ANION GAP SERPL CALCULATED.3IONS-SCNC: 13.4 MMOL/L (ref 8–16)
AST SERPL-CCNC: 45 U/L (ref 15–37)
BILIRUB DIRECT SERPL-MCNC: 0.1 MG/DL (ref 0–0.3)
BILIRUB SERPL-MCNC: 0.3 MG/DL (ref 0–1)
BUN SERPL-MCNC: 10 MG/DL (ref 7–18)
BUN SERPL-MCNC: 12 MG/DL (ref 7–18)
BUN SERPL-MCNC: 12 MG/DL (ref 7–18)
CHLORIDE SERPL-SCNC: 77 MMOL/L (ref 98–107)
CHLORIDE SERPL-SCNC: 79 MMOL/L (ref 98–107)
CHLORIDE SERPL-SCNC: 83 MMOL/L (ref 98–107)
CO2 SERPL-SCNC: 27 MMOL/L (ref 21–32)
CO2 SERPL-SCNC: 27.1 MMOL/L (ref 21–32)
CO2 SERPL-SCNC: 28.1 MMOL/L (ref 21–32)
CREAT SERPL-MCNC: 0.6 MG/DL (ref 0.6–1.3)
CREAT UR-MCNC: 3 MG/DL (ref 30–125)
GFR SERPL CREATININE-BSD FRML MDRD: (no result) ML/MIN (ref 90–?)
GLUCOSE SERPL-MCNC: 110 MG/DL (ref 74–106)
GLUCOSE SERPL-MCNC: 156 MG/DL (ref 74–106)
GLUCOSE SERPL-MCNC: 206 MG/DL (ref 74–106)
POTASSIUM SERPL-SCNC: 3.5 MMOL/L (ref 3.5–5.1)
POTASSIUM SERPL-SCNC: 3.7 MMOL/L (ref 3.5–5.1)
POTASSIUM SERPL-SCNC: 3.9 MMOL/L (ref 3.5–5.1)
POTASSIUM UR-SCNC: 17 MMOL/L (ref 12–75)
SODIUM SERPL-SCNC: 113 MMOL/L (ref 136–145)
SODIUM SERPL-SCNC: 117 MMOL/L (ref 136–145)
SODIUM SERPL-SCNC: 119 MMOL/L (ref 136–145)
SODIUM UR-SCNC: 82 MMOL/L (ref 40–220)

## 2023-04-03 PROCEDURE — XW033E5 INTRODUCTION OF REMDESIVIR ANTI-INFECTIVE INTO PERIPHERAL VEIN, PERCUTANEOUS APPROACH, NEW TECHNOLOGY GROUP 5: ICD-10-PCS | Performed by: INTERNAL MEDICINE

## 2023-04-03 RX ADMIN — ENOXAPARIN SODIUM SCH MG: 40 INJECTION SUBCUTANEOUS at 10:28

## 2023-04-03 RX ADMIN — FUROSEMIDE SCH MG: 10 INJECTION, SOLUTION INTRAMUSCULAR; INTRAVENOUS at 11:06

## 2023-04-03 RX ADMIN — PANTOPRAZOLE SODIUM SCH MG: 40 TABLET, DELAYED RELEASE ORAL at 10:26

## 2023-04-03 RX ADMIN — FUROSEMIDE SCH MG: 10 INJECTION, SOLUTION INTRAMUSCULAR; INTRAVENOUS at 21:00

## 2023-04-03 RX ADMIN — SODIUM CHLORIDE SCH MLS/HR: 9 INJECTION, SOLUTION INTRAVENOUS at 11:14

## 2023-04-03 RX ADMIN — Medication SCH MG: at 21:33

## 2023-04-03 RX ADMIN — DEXTROSE SCH MLS/HR: 50 INJECTION, SOLUTION INTRAVENOUS at 22:11

## 2023-04-03 RX ADMIN — FOLIC ACID SCH MLS/HR: 5 INJECTION, SOLUTION INTRAMUSCULAR; INTRAVENOUS; SUBCUTANEOUS at 03:33

## 2023-04-03 RX ADMIN — LOSARTAN POTASSIUM SCH MG: 50 TABLET, FILM COATED ORAL at 10:27

## 2023-04-03 RX ADMIN — Medication SCH MG: at 10:26

## 2023-04-03 NOTE — NUR
RECEIVED CALLED FROM LAB CRITICAL LAB RESULT FOR . DR. AVERY NOTIFIED, NO NEW ORDER, DR. CALDWELL ON BOARD.

## 2023-04-03 NOTE — NUR
SPOKE TO PT USING  # [id number]. PT REQUESTED FOR SNACKS. SNACKS GIVEN. NO COMPLAINT 
OF ANY DISCOMFORT.

## 2023-04-03 NOTE — NUR
RECEIVED REPORT FROM PHILL MCGHEE FOR CONTINUITY OF CARE. PT AWAKE WITH DAUGHTER JORGE AT 
BEDSIDE. RESPIRATIONS EVEN AND UNLABORED ON 2L NC. NO DISTRESS NOTED. SATTING AT 99%. DENIES 
PAIN. ON CARDIAC MONITOR. IV SITE ON RAC 20G INFUSING NA 3% AT 25ML/HR. POC DISCUSSED WITH 
PT AND JEFFREY BYNUM. CALL LIGHT WITHIN REACH. SAFETY PRECAUTIONS IN PLACE.

## 2023-04-03 NOTE — NUR
RECEIVED REPORT FROM [facility] FOR CONTINUITY OF CARE. INITIAL ASSESSMENT DONE. 
ON CONTACT AND DROPLET PRECAUTION D/T POSITIVE WITH COVID 19. ON CONT. O2 @ 2L/NC. IVF 
INFUSING WELL. RESP. EVEN AND UNLABORED. NO C/O PAIN OR DISCOMFORT. CALL LIGHT KEPT WITHIN 
REACH. WILL CONTINUE TO MONITOR.

## 2023-04-04 VITALS — DIASTOLIC BLOOD PRESSURE: 82 MMHG | SYSTOLIC BLOOD PRESSURE: 118 MMHG

## 2023-04-04 VITALS — SYSTOLIC BLOOD PRESSURE: 120 MMHG | DIASTOLIC BLOOD PRESSURE: 75 MMHG

## 2023-04-04 VITALS — SYSTOLIC BLOOD PRESSURE: 136 MMHG | DIASTOLIC BLOOD PRESSURE: 59 MMHG

## 2023-04-04 VITALS — DIASTOLIC BLOOD PRESSURE: 68 MMHG | SYSTOLIC BLOOD PRESSURE: 142 MMHG

## 2023-04-04 VITALS — DIASTOLIC BLOOD PRESSURE: 62 MMHG | SYSTOLIC BLOOD PRESSURE: 116 MMHG

## 2023-04-04 VITALS — DIASTOLIC BLOOD PRESSURE: 77 MMHG | SYSTOLIC BLOOD PRESSURE: 132 MMHG

## 2023-04-04 LAB
ALBUMIN FLD-MCNC: 2.7 G/DL (ref 3.4–5)
ANION GAP SERPL CALCULATED.3IONS-SCNC: 11.1 MMOL/L (ref 8–16)
ANION GAP SERPL CALCULATED.3IONS-SCNC: 11.2 MMOL/L (ref 8–16)
ANION GAP SERPL CALCULATED.3IONS-SCNC: 11.9 MMOL/L (ref 8–16)
ANION GAP SERPL CALCULATED.3IONS-SCNC: 8.3 MMOL/L (ref 8–16)
AST SERPL-CCNC: 39 U/L (ref 15–37)
BILIRUB DIRECT SERPL-MCNC: 0.1 MG/DL (ref 0–0.3)
BILIRUB SERPL-MCNC: 0.3 MG/DL (ref 0–1)
BUN SERPL-MCNC: 12 MG/DL (ref 7–18)
BUN SERPL-MCNC: 13 MG/DL (ref 7–18)
BUN SERPL-MCNC: 14 MG/DL (ref 7–18)
BUN SERPL-MCNC: 15 MG/DL (ref 7–18)
CHLORIDE SERPL-SCNC: 78 MMOL/L (ref 98–107)
CHLORIDE SERPL-SCNC: 82 MMOL/L (ref 98–107)
CHLORIDE SERPL-SCNC: 84 MMOL/L (ref 98–107)
CHLORIDE SERPL-SCNC: 89 MMOL/L (ref 98–107)
CO2 SERPL-SCNC: 25.8 MMOL/L (ref 21–32)
CO2 SERPL-SCNC: 27.3 MMOL/L (ref 21–32)
CO2 SERPL-SCNC: 29.3 MMOL/L (ref 21–32)
CO2 SERPL-SCNC: 31 MMOL/L (ref 21–32)
CREAT SERPL-MCNC: 0.6 MG/DL (ref 0.6–1.3)
CREAT SERPL-MCNC: 0.6 MG/DL (ref 0.6–1.3)
CREAT SERPL-MCNC: 0.7 MG/DL (ref 0.6–1.3)
CREAT SERPL-MCNC: 0.7 MG/DL (ref 0.6–1.3)
GFR SERPL CREATININE-BSD FRML MDRD: (no result) ML/MIN (ref 90–?)
GLUCOSE SERPL-MCNC: 134 MG/DL (ref 74–106)
GLUCOSE SERPL-MCNC: 170 MG/DL (ref 74–106)
GLUCOSE SERPL-MCNC: 170 MG/DL (ref 74–106)
GLUCOSE SERPL-MCNC: 211 MG/DL (ref 74–106)
POTASSIUM SERPL-SCNC: 3.3 MMOL/L (ref 3.5–5.1)
POTASSIUM SERPL-SCNC: 3.4 MMOL/L (ref 3.5–5.1)
POTASSIUM SERPL-SCNC: 3.5 MMOL/L (ref 3.5–5.1)
POTASSIUM SERPL-SCNC: 3.7 MMOL/L (ref 3.5–5.1)
SODIUM SERPL-SCNC: 115 MMOL/L (ref 136–145)
SODIUM SERPL-SCNC: 117 MMOL/L (ref 136–145)
SODIUM SERPL-SCNC: 120 MMOL/L (ref 136–145)
SODIUM SERPL-SCNC: 123 MMOL/L (ref 136–145)

## 2023-04-04 RX ADMIN — Medication SCH MG: at 21:28

## 2023-04-04 RX ADMIN — LOSARTAN POTASSIUM SCH MG: 50 TABLET, FILM COATED ORAL at 08:51

## 2023-04-04 RX ADMIN — FUROSEMIDE SCH MG: 10 INJECTION, SOLUTION INTRAMUSCULAR; INTRAVENOUS at 22:55

## 2023-04-04 RX ADMIN — Medication SCH MG: at 08:52

## 2023-04-04 RX ADMIN — DEXTROSE SCH MLS/HR: 50 INJECTION, SOLUTION INTRAVENOUS at 21:00

## 2023-04-04 RX ADMIN — FUROSEMIDE SCH MG: 10 INJECTION, SOLUTION INTRAMUSCULAR; INTRAVENOUS at 08:31

## 2023-04-04 RX ADMIN — PANTOPRAZOLE SODIUM SCH MG: 40 TABLET, DELAYED RELEASE ORAL at 08:52

## 2023-04-04 RX ADMIN — Medication SCH MG: at 08:51

## 2023-04-04 RX ADMIN — ENOXAPARIN SODIUM SCH MG: 40 INJECTION SUBCUTANEOUS at 09:00

## 2023-04-04 RX ADMIN — SODIUM CHLORIDE SCH MLS/HR: 9 INJECTION, SOLUTION INTRAVENOUS at 12:11

## 2023-04-04 NOTE — NUR
PT COMPLAINING OF LEG PAIN. PT ONLY HAS MOTRIN FOR PAIN BUT NO AVAILABLE MEDICINE IN THE 
WHOLE HOSPITAL. HOUSE SUP AWARE. PAGED DR UP FOR OTHER PAIN MED ORDER. AWAITING FOR CALL 
BACK.

## 2023-04-04 NOTE — NUR
DC PLANNING 



*** ASSESSMENT COMPLETE***



PLEASE REFER TO ASSESSMENT FOR ADDITIONAL DETAILS 



JORGE DUMONT TENTATIVE DC PLAN IS FOR PT TO RETURN HOME WITH FAMILY 

PROVIDING TRANSPORTATION, WHEN MEDICALLY STABLE.


-------------------------------------------------------------------------------

Addendum: 04/05/23 at 0829 by Sheridan ELDER

-------------------------------------------------------------------------------

Amended: Links added.

## 2023-04-04 NOTE — NUR
RECEIVED REPORT FROM DAY SHIFT NURSE LITZY FOR CONTINUITY OF CARE. PT AWAKE, WITH 
DAUGHTER JORGE AT BEDSIDE. RESPIRATIONS EVEN AND UNLABORED ON 2L NC. SATTING AT 99%. NO 
DISTRESS NOTED. NO COMPLAINTS OF PAIN. POC DISCUSSED WITH PT AND JEFFREY PARDO. CALL LIGHT 
WITHIN REACH. SAFETY PRECAUTIONS IN PLACE.

## 2023-04-04 NOTE — NUR
PT COMPLAINT OF DRY EYES TO BOTH EYES. NO REDNESS. NO DRAINAGE NOTED. DR. AVERY NOTIFIED 
AWAITING FOR RESPONSE.

## 2023-04-04 NOTE — NUR
DID MORNING CARE. SPOKE TO PT'S DAUGHTER JORGE. AS PER JORGE, PT'S LEG PAIN GOES DOWN AND 
NOW TOLERABLE. LEFT PT RESTING COMFORTABLY IN BED. SAFETY PRECAUTIONS IN PLACE.

## 2023-04-04 NOTE — NUR
RECEIVED CRITICAL LAB RESULT SODIUM 117. DR. CALDWELL NOTIFIED WITH NEW ORDER. INCREASED NACL 
3% @ 50 ML/HR. ORDER NOTED AND CARRIED OUT.

## 2023-04-04 NOTE — NUR
INFORMED DR CALDWELL ABOUT LAB RESULT FOR SODIUM 123. MD ORDERED TO CONTINUE GIVING SODIUM 
CHLORIDE 3% 500ML AT 40CC/HR. CONTINUE UNTIL SODIUM GETS -128 THEN D/C AS PER MD. 
INFORMED JEFFREY PARDO.

## 2023-04-04 NOTE — NUR
RECEIVED REPORT FROM NIGHT SHIFT INES FOR CONTINUITY OF CARE. INITIAL ASSESSMENT OF DONE. 
RESP. EVEN AND UNLABORED. IVF INFUSING WELL. CALL LIGHT KEPT WITHIN REACH. WILL CONTINUE TO 
MONITOR.

## 2023-04-05 VITALS — DIASTOLIC BLOOD PRESSURE: 67 MMHG | SYSTOLIC BLOOD PRESSURE: 130 MMHG

## 2023-04-05 VITALS — DIASTOLIC BLOOD PRESSURE: 66 MMHG | SYSTOLIC BLOOD PRESSURE: 147 MMHG

## 2023-04-05 VITALS — DIASTOLIC BLOOD PRESSURE: 65 MMHG | SYSTOLIC BLOOD PRESSURE: 125 MMHG

## 2023-04-05 VITALS — SYSTOLIC BLOOD PRESSURE: 143 MMHG | DIASTOLIC BLOOD PRESSURE: 72 MMHG

## 2023-04-05 VITALS — DIASTOLIC BLOOD PRESSURE: 78 MMHG | SYSTOLIC BLOOD PRESSURE: 137 MMHG

## 2023-04-05 LAB
ALBUMIN FLD-MCNC: 3 G/DL (ref 3.4–5)
ANION GAP SERPL CALCULATED.3IONS-SCNC: 10.4 MMOL/L (ref 8–16)
ANION GAP SERPL CALCULATED.3IONS-SCNC: 11.2 MMOL/L (ref 8–16)
ANION GAP SERPL CALCULATED.3IONS-SCNC: 8.4 MMOL/L (ref 8–16)
AST SERPL-CCNC: 38 U/L (ref 15–37)
BILIRUB DIRECT SERPL-MCNC: 0.1 MG/DL (ref 0–0.3)
BILIRUB SERPL-MCNC: 0.2 MG/DL (ref 0–1)
BUN SERPL-MCNC: 13 MG/DL (ref 7–18)
BUN SERPL-MCNC: 14 MG/DL (ref 7–18)
BUN SERPL-MCNC: 14 MG/DL (ref 7–18)
CHLORIDE SERPL-SCNC: 88 MMOL/L (ref 98–107)
CHLORIDE SERPL-SCNC: 89 MMOL/L (ref 98–107)
CHLORIDE SERPL-SCNC: 90 MMOL/L (ref 98–107)
CO2 SERPL-SCNC: 29.4 MMOL/L (ref 21–32)
CO2 SERPL-SCNC: 31.6 MMOL/L (ref 21–32)
CO2 SERPL-SCNC: 33 MMOL/L (ref 21–32)
CREAT SERPL-MCNC: 0.6 MG/DL (ref 0.6–1.3)
GFR SERPL CREATININE-BSD FRML MDRD: (no result) ML/MIN (ref 90–?)
GLUCOSE SERPL-MCNC: 103 MG/DL (ref 74–106)
GLUCOSE SERPL-MCNC: 112 MG/DL (ref 74–106)
GLUCOSE SERPL-MCNC: 184 MG/DL (ref 74–106)
POTASSIUM SERPL-SCNC: 3.4 MMOL/L (ref 3.5–5.1)
POTASSIUM SERPL-SCNC: 3.6 MMOL/L (ref 3.5–5.1)
POTASSIUM SERPL-SCNC: 4 MMOL/L (ref 3.5–5.1)
SODIUM SERPL-SCNC: 125 MMOL/L (ref 136–145)
SODIUM SERPL-SCNC: 127 MMOL/L (ref 136–145)
SODIUM SERPL-SCNC: 128 MMOL/L (ref 136–145)

## 2023-04-05 RX ADMIN — ENOXAPARIN SODIUM SCH MG: 40 INJECTION SUBCUTANEOUS at 08:56

## 2023-04-05 RX ADMIN — SODIUM CHLORIDE SCH MLS/HR: 9 INJECTION, SOLUTION INTRAVENOUS at 11:03

## 2023-04-05 RX ADMIN — FUROSEMIDE SCH MG: 10 INJECTION, SOLUTION INTRAMUSCULAR; INTRAVENOUS at 08:56

## 2023-04-05 RX ADMIN — PANTOPRAZOLE SODIUM SCH MG: 40 TABLET, DELAYED RELEASE ORAL at 08:55

## 2023-04-05 RX ADMIN — Medication SCH MG: at 08:55

## 2023-04-05 RX ADMIN — LOSARTAN POTASSIUM SCH MG: 50 TABLET, FILM COATED ORAL at 08:55

## 2023-04-05 NOTE — NUR
ASSISTED CNA IN DOING MORNING CARE. PT TOLERATED WELL. NO COMPLAINTS OF PAIN. NO DISTRESS 
NOTED. PT SATTING AT 99% ON 2L NC. SAFETY PRECAUTIONS IN PLACE.

## 2023-04-05 NOTE — NUR
4/5/23 RD INITIAL ASSESSMENT COMPLETED



PLEASE REFER TO NUTRITION ASSESSMENT UNDER CARE ACTIVITY FOR ESTIMATED NUTRITIONAL NEEDS. 



1. CONTINUE REGULAR DIET, FLUID 1000 ML PER MD AS TOLERATED 

2. MONITOR PO INTAKE AND NUTRITION RELATED LAB VALUES

3. RD TO FOLLOW-UP 7 DAYS, LOW RISK 



REVIEWED BY ANTHONY LESLIE RD

## 2023-04-05 NOTE — NUR
made some discharge patient teaching. patient in a stable condition . no sign and symptoms 
of pain at this time . no complain of shortness of breath . brought to the lobby thru a 
wheelchair to a waiting private car with the daughter

## 2023-04-05 NOTE — NUR
receive the patient from the night shift rn in 177. aox4 with admitting diagnosis of Covid 
pneumonia . will continue to monitor.

## 2023-04-06 NOTE — NUR
CALLED DR PRIETO'S OFFICE LOCATED  [address] [facility] [place] [address] [phone] SPOKE 
WITH AAMIR HOW INFORMED ME THAT THE OFFICE IS A WALK IN CLINIC. CALLED DAUGHTER JORGE 
[phone] AND INFORMED HER OF THE ABOVE INFORMATION.